# Patient Record
Sex: MALE | NOT HISPANIC OR LATINO | Employment: FULL TIME | URBAN - METROPOLITAN AREA
[De-identification: names, ages, dates, MRNs, and addresses within clinical notes are randomized per-mention and may not be internally consistent; named-entity substitution may affect disease eponyms.]

---

## 2024-10-08 ENCOUNTER — OFFICE VISIT (OUTPATIENT)
Dept: URGENT CARE | Age: 44
End: 2024-10-08
Payer: COMMERCIAL

## 2024-10-08 ENCOUNTER — APPOINTMENT (OUTPATIENT)
Dept: RADIOLOGY | Facility: HOSPITAL | Age: 44
End: 2024-10-08
Payer: COMMERCIAL

## 2024-10-08 ENCOUNTER — HOSPITAL ENCOUNTER (EMERGENCY)
Facility: HOSPITAL | Age: 44
Discharge: HOME | End: 2024-10-08
Attending: EMERGENCY MEDICINE
Payer: COMMERCIAL

## 2024-10-08 VITALS
TEMPERATURE: 98 F | RESPIRATION RATE: 16 BRPM | DIASTOLIC BLOOD PRESSURE: 77 MMHG | OXYGEN SATURATION: 95 % | SYSTOLIC BLOOD PRESSURE: 114 MMHG | WEIGHT: 285 LBS | HEART RATE: 74 BPM

## 2024-10-08 VITALS
BODY MASS INDEX: 36.41 KG/M2 | SYSTOLIC BLOOD PRESSURE: 125 MMHG | HEART RATE: 71 BPM | RESPIRATION RATE: 16 BRPM | WEIGHT: 274.69 LBS | DIASTOLIC BLOOD PRESSURE: 78 MMHG | HEIGHT: 73 IN | OXYGEN SATURATION: 98 % | TEMPERATURE: 97.9 F

## 2024-10-08 DIAGNOSIS — N50.812 PAIN IN LEFT TESTICLE: Primary | ICD-10-CM

## 2024-10-08 DIAGNOSIS — N50.812 TESTICULAR PAIN, LEFT: Primary | ICD-10-CM

## 2024-10-08 LAB
APPEARANCE UR: ABNORMAL
BILIRUB UR STRIP.AUTO-MCNC: NEGATIVE MG/DL
COLOR UR: YELLOW
GLUCOSE UR STRIP.AUTO-MCNC: NORMAL MG/DL
KETONES UR STRIP.AUTO-MCNC: NEGATIVE MG/DL
LEUKOCYTE ESTERASE UR QL STRIP.AUTO: NEGATIVE
NITRITE UR QL STRIP.AUTO: NEGATIVE
PH UR STRIP.AUTO: 7 [PH]
PROT UR STRIP.AUTO-MCNC: NEGATIVE MG/DL
RBC # UR STRIP.AUTO: NEGATIVE /UL
SP GR UR STRIP.AUTO: 1.02
UROBILINOGEN UR STRIP.AUTO-MCNC: NORMAL MG/DL

## 2024-10-08 PROCEDURE — 99284 EMERGENCY DEPT VISIT MOD MDM: CPT | Mod: 25

## 2024-10-08 PROCEDURE — 99203 OFFICE O/P NEW LOW 30 MIN: CPT | Performed by: PHYSICIAN ASSISTANT

## 2024-10-08 PROCEDURE — 76870 US EXAM SCROTUM: CPT | Performed by: RADIOLOGY

## 2024-10-08 PROCEDURE — 87491 CHLMYD TRACH DNA AMP PROBE: CPT | Mod: TRILAB,WESLAB | Performed by: PHYSICIAN ASSISTANT

## 2024-10-08 PROCEDURE — 81003 URINALYSIS AUTO W/O SCOPE: CPT | Performed by: PHYSICIAN ASSISTANT

## 2024-10-08 PROCEDURE — 93975 VASCULAR STUDY: CPT

## 2024-10-08 RX ORDER — DOXYCYCLINE 100 MG/1
100 CAPSULE ORAL 2 TIMES DAILY
Qty: 20 CAPSULE | Refills: 0 | Status: SHIPPED | OUTPATIENT
Start: 2024-10-08 | End: 2024-10-18

## 2024-10-08 RX ORDER — KETOROLAC TROMETHAMINE 30 MG/ML
30 INJECTION, SOLUTION INTRAMUSCULAR; INTRAVENOUS ONCE
Status: DISCONTINUED | OUTPATIENT
Start: 2024-10-08 | End: 2024-10-08 | Stop reason: HOSPADM

## 2024-10-08 ASSESSMENT — ENCOUNTER SYMPTOMS
DIAPHORESIS: 0
FEVER: 0
CHILLS: 0

## 2024-10-08 ASSESSMENT — COLUMBIA-SUICIDE SEVERITY RATING SCALE - C-SSRS
6. HAVE YOU EVER DONE ANYTHING, STARTED TO DO ANYTHING, OR PREPARED TO DO ANYTHING TO END YOUR LIFE?: NO
1. IN THE PAST MONTH, HAVE YOU WISHED YOU WERE DEAD OR WISHED YOU COULD GO TO SLEEP AND NOT WAKE UP?: NO
2. HAVE YOU ACTUALLY HAD ANY THOUGHTS OF KILLING YOURSELF?: NO

## 2024-10-08 ASSESSMENT — PAIN - FUNCTIONAL ASSESSMENT: PAIN_FUNCTIONAL_ASSESSMENT: 0-10

## 2024-10-08 ASSESSMENT — PAIN SCALES - GENERAL
PAINLEVEL_OUTOF10: 2
PAINLEVEL_OUTOF10: 0 - NO PAIN

## 2024-10-08 NOTE — PROGRESS NOTES
Attestation/Supervisory note for NURA Mott      The patient is a 44-year-old male presenting to the emergency department for evaluation of pain and swelling of his testicles.  He states that is been gradually worsening over the past 3 to 4 days.  He denies any recent injury or trauma.  He denies any headache or visual changes.  He denies any chest pain or shortness of breath.  No abdominal pain.  No nausea or vomiting.  No diarrhea or constipation.  He states he did have some burning with urination several weeks ago but does not have any current symptoms.  He denies any fever or chills.  No weakness or numbness.  All pertinent positives and negatives are recorded above.  All other systems reviewed and otherwise negative.  Vital signs with borderline diastolic hypertension but otherwise within normal limits.  Physical exam with a well-nourished well-developed male in no acute distress.  HEENT exam within normal limits.  He has no evidence of airway compromise or respiratory distress.  Abdominal exam is benign.   exam with evidence of left-sided testicular swelling and tenderness to palpation.      IM Toradol ordered.      Diagnostic labs without significant abnormality      US scrotum w doppler   Final Result   Extremely heterogeneous and hypervascular appearance of the left   testis with a nonspecific region of peripheral preserved homogeneous   parenchyma. Findings may represent orchitis, however given the   sharply demarcated region of preserved parenchyma, findings are   highly concerning for an underlying mass involving the testicle.   Interval follow-up sonographic surveillance after appropriate   treatment is advised. Associated moderate left-sided hydrocele.   Follow-up testicular MRI may be useful as well.        Small right-sided hydrocele.        MACRO:   None        Signed by: Hermelindo Stratton 10/8/2024 4:02 PM   Dictation workstation:   [id number]           The patient has signs and symptoms concerning  for possible orchitis versus testicular mass.  Diagnostic labs without significant abnormality at this time.  The patient did wait to have a scrotal ultrasound performed but did not want to wait in the emergency room to get his results.  He eloped without waiting to get the results of his ultrasound.  The patient was contacted by phone by me and notified of the results of his ultrasound.  He was given a referral to urology, Dr. Sullivan, and agree to follow-up within 1 to 2 days for further management of his current symptoms.  He also agreed to follow-up with his primary care physician within 1 to 2 days for further management of his current symptoms.  He did not want to return to the emergency room for any further studies or treatment at this time.      Impression/diagnosis:  Left-sided testicular pain/edema  Orchitis  Testicular mass, left-sided      I personally saw the patient and made/approve the management plan and take responsibility for the patient management.      I independently interpreted the following study (S) diagnostic labs      I personally discussed the patient's management with the patient      I reviewed the results of the diagnostic labs and diagnostic imaging.  Formal radiology read was completed by the radiologist.      Noy Marina MD

## 2024-10-08 NOTE — ED PROVIDER NOTES
HPI   Chief Complaint   Patient presents with    Groin Swelling     Pt complains of pain and swelling in his testicles that has been getting worse x3 days.  Was sent from urgent care.        44-year-old male presented emergency department with a chief complaint of left testicular pain for the last 3 days.  He describes swelling to the area.  He did have some burning with urination several weeks ago.  Otherwise has no significant past medical history.  Denies lightheadedness dizziness numbness weakness.  Denies abdominal pain.  Denies flank pain.  No other complaint.              Patient History   No past medical history on file.  No past surgical history on file.  No family history on file.  Social History     Tobacco Use    Smoking status: Not on file    Smokeless tobacco: Not on file   Substance Use Topics    Alcohol use: Not on file    Drug use: Not on file       Physical Exam   ED Triage Vitals [10/08/24 1251]   Temperature Heart Rate Respirations BP   36.6 °C (97.9 °F) 71 16 125/78      Pulse Ox Temp src Heart Rate Source Patient Position   98 % -- -- --      BP Location FiO2 (%)     -- --       Physical Exam  Vitals and nursing note reviewed.   Constitutional:       Appearance: Normal appearance.   HENT:      Head: Normocephalic.      Nose: Nose normal.      Mouth/Throat:      Mouth: Mucous membranes are moist.   Cardiovascular:      Rate and Rhythm: Normal rate.      Pulses: Normal pulses.   Pulmonary:      Effort: Pulmonary effort is normal.   Abdominal:      General: Abdomen is flat.   Genitourinary:     Comments: Left testicle is moderately edematous and erythematous, no skin breakdown, no Rhina's, no inguinal hernia, intact cremasteric reflex  Musculoskeletal:         General: Normal range of motion.      Cervical back: Normal range of motion.   Skin:     General: Skin is warm.   Neurological:      General: No focal deficit present.      Mental Status: He is alert and oriented to person, place, and  time.   Psychiatric:         Mood and Affect: Mood normal.           ED Course & MDM   Diagnoses as of 10/08/24 1710   Pain in left testicle                 No data recorded     Olean Coma Scale Score: 15 (10/08/24 1251 : Amos Tatum, EMT)                           Medical Decision Making  I have seen and evaluated this patient.  The attending physician has also seen and evaluated this patient.  Vital signs, laboratory testing and diagnostic images if applicable have been reviewed.  All laboratory and imaging is interpreted by myself unless otherwise stated.  Radiology studies are also formally interpreted by radiologist.    Patient had to leave before radiology interpretation of ultrasound.  His urinalysis is negative.  His ultrasound may represent orchitis but is highly concerning for testicular mass.  I did call the patient and inform him of this finding.  I will treat with antibiotic in the interim and patient was given follow-up with urologist Dr. Sullivan for further assessment into potential testicular mass.          Labs Reviewed   URINALYSIS WITH REFLEX CULTURE AND MICROSCOPIC - Abnormal       Result Value    Color, Urine Yellow      Appearance, Urine Turbid (*)     Specific Gravity, Urine 1.020      pH, Urine 7.0      Protein, Urine NEGATIVE      Glucose, Urine Normal      Blood, Urine NEGATIVE      Ketones, Urine NEGATIVE      Bilirubin, Urine NEGATIVE      Urobilinogen, Urine Normal      Nitrite, Urine NEGATIVE      Leukocyte Esterase, Urine NEGATIVE     URINALYSIS WITH REFLEX CULTURE AND MICROSCOPIC    Narrative:     The following orders were created for panel order Urinalysis with Reflex Culture and Microscopic.  Procedure                               Abnormality         Status                     ---------                               -----------         ------                     Urinalysis with Reflex C...[[id number]]  Abnormal            Final result               Extra Urine Bush  Tube[[id number]]                                                         Please view results for these tests on the individual orders.   EXTRA URINE GRAY TUBE   C. TRACHOMATIS + N. GONORRHOEAE, AMPLIFIED     US scrotum w doppler   Final Result   Extremely heterogeneous and hypervascular appearance of the left   testis with a nonspecific region of peripheral preserved homogeneous   parenchyma. Findings may represent orchitis, however given the   sharply demarcated region of preserved parenchyma, findings are   highly concerning for an underlying mass involving the testicle.   Interval follow-up sonographic surveillance after appropriate   treatment is advised. Associated moderate left-sided hydrocele.   Follow-up testicular MRI may be useful as well.        Small right-sided hydrocele.        MACRO:   None        Signed by: Hermelindo Stratton 10/8/2024 4:02 PM   Dictation workstation:   [id number]        Medications   ketorolac (Toradol) injection 30 mg (30 mg intramuscular Not Given 10/8/24 1343)     There are no discharge medications for this patient.        Procedure  Procedures     Tom Mott PA-C  10/08/24 9050

## 2024-10-08 NOTE — PROGRESS NOTES
Subjective   Patient ID: Jersey Crespo is a 44 y.o. male. They present today with a chief complaint of SWOLLEN TESTICLES (Patient here with swollen testicles x 3 days. Redness, swollen tenderness.).    History of Present Illness  Left testicular pain, on and off x 3 days, erythema, swelling.    Denies fever, chills, sweats, nausea, vomiting.     Here with wife, transporting to [facility]          Past Medical History  Allergies as of 10/08/2024    (No Known Allergies)       (Not in a hospital admission)       History reviewed. No pertinent past medical history.    History reviewed. No pertinent surgical history.         Review of Systems  Review of Systems   Constitutional:  Negative for chills, diaphoresis and fever.   Genitourinary:  Positive for scrotal swelling and testicular pain.   All other systems reviewed and are negative.                                 Objective    Vitals:    10/08/24 1221   BP: 114/77   BP Location: Right arm   Pulse: 74   Resp: 16   Temp: 36.7 °C (98 °F)   SpO2: 95%   Weight: 129 kg (285 lb)     No LMP for male patient.    Physical Exam  Vitals and nursing note reviewed.   Genitourinary:     Comments: Left testicle swollen, erythema, no cremasteric reflex visualized.   Neurological:      Mental Status: He is alert.         Procedures    Point of Care Test & Imaging Results from this visit  No results found for this visit on 10/08/24.   No results found.    Diagnostic study results (if any) were reviewed by Hannah Hein PA-C.    Assessment/Plan   Allergies, medications, history, and pertinent labs/EKGs/Imaging reviewed by Hannah Hein PA-C.     Orders and Diagnoses  There are no diagnoses linked to this encounter.    Medical Admin Record      Patient disposition: ED    Electronically signed by Hannah Hein PA-C  12:35 PM

## 2024-10-09 LAB
C TRACH RRNA SPEC QL NAA+PROBE: NEGATIVE
N GONORRHOEA DNA SPEC QL PROBE+SIG AMP: NEGATIVE

## 2024-10-18 ENCOUNTER — LAB (OUTPATIENT)
Dept: LAB | Facility: LAB | Age: 44
End: 2024-10-18
Payer: COMMERCIAL

## 2024-10-18 DIAGNOSIS — D40.12 NEOPLASM OF UNCERTAIN BEHAVIOR OF LEFT TESTIS: Primary | ICD-10-CM

## 2024-10-18 LAB
B-HCG SERPL-ACNC: <2 MIU/ML
LDH SERPL L TO P-CCNC: 138 U/L (ref 84–246)

## 2024-10-18 PROCEDURE — 84702 CHORIONIC GONADOTROPIN TEST: CPT

## 2024-10-18 PROCEDURE — 82105 ALPHA-FETOPROTEIN SERUM: CPT

## 2024-10-18 PROCEDURE — 83615 LACTATE (LD) (LDH) ENZYME: CPT

## 2024-10-18 PROCEDURE — 36415 COLL VENOUS BLD VENIPUNCTURE: CPT

## 2024-10-19 LAB — AFP SERPL-MCNC: <4 NG/ML (ref 0–9)

## 2024-10-31 ENCOUNTER — HOSPITAL ENCOUNTER (OUTPATIENT)
Dept: RADIOLOGY | Facility: HOSPITAL | Age: 44
Discharge: HOME | End: 2024-10-31
Payer: COMMERCIAL

## 2024-10-31 DIAGNOSIS — D40.12 NEOPLASM OF UNCERTAIN BEHAVIOR OF LEFT TESTIS: ICD-10-CM

## 2024-10-31 PROCEDURE — 76870 US EXAM SCROTUM: CPT

## 2024-10-31 PROCEDURE — 76870 US EXAM SCROTUM: CPT | Performed by: RADIOLOGY

## 2024-11-20 ENCOUNTER — HOSPITAL ENCOUNTER (OUTPATIENT)
Dept: RADIOLOGY | Facility: HOSPITAL | Age: 44
Discharge: HOME | End: 2024-11-20
Payer: COMMERCIAL

## 2024-11-20 DIAGNOSIS — D40.12 NEOPLASM OF UNCERTAIN BEHAVIOR OF LEFT TESTIS: ICD-10-CM

## 2024-11-20 PROCEDURE — 76870 US EXAM SCROTUM: CPT | Performed by: RADIOLOGY

## 2024-11-20 PROCEDURE — 93975 VASCULAR STUDY: CPT

## 2024-11-20 PROCEDURE — 93976 VASCULAR STUDY: CPT | Performed by: RADIOLOGY

## 2024-12-23 ENCOUNTER — HOSPITAL ENCOUNTER (OUTPATIENT)
Dept: RADIOLOGY | Facility: HOSPITAL | Age: 44
Discharge: HOME | End: 2024-12-23
Payer: COMMERCIAL

## 2024-12-23 DIAGNOSIS — D40.12 NEOPLASM OF UNCERTAIN BEHAVIOR OF LEFT TESTIS: ICD-10-CM

## 2024-12-23 PROCEDURE — 2550000001 HC RX 255 CONTRASTS: Performed by: UROLOGY

## 2024-12-23 PROCEDURE — 74177 CT ABD & PELVIS W/CONTRAST: CPT | Performed by: RADIOLOGY

## 2024-12-23 PROCEDURE — 74177 CT ABD & PELVIS W/CONTRAST: CPT
